# Patient Record
Sex: FEMALE | Race: WHITE | Employment: UNEMPLOYED | URBAN - METROPOLITAN AREA
[De-identification: names, ages, dates, MRNs, and addresses within clinical notes are randomized per-mention and may not be internally consistent; named-entity substitution may affect disease eponyms.]

---

## 2024-08-09 RX ORDER — METFORMIN HCL 500 MG
500 TABLET, EXTENDED RELEASE 24 HR ORAL
COMMUNITY

## 2024-08-09 RX ORDER — LEVOTHYROXINE SODIUM 75 UG/1
75 TABLET ORAL
COMMUNITY

## 2024-08-09 RX ORDER — BUDESONIDE AND FORMOTEROL FUMARATE DIHYDRATE 80; 4.5 UG/1; UG/1
2 AEROSOL RESPIRATORY (INHALATION) 2 TIMES DAILY
COMMUNITY

## 2024-08-09 RX ORDER — LEVOTHYROXINE SODIUM 50 UG/1
50 TABLET ORAL DAILY
COMMUNITY

## 2024-08-09 RX ORDER — MONTELUKAST SODIUM 10 MG/1
10 TABLET ORAL DAILY
COMMUNITY

## 2024-08-09 RX ORDER — GABAPENTIN 300 MG/1
300 CAPSULE ORAL DAILY
COMMUNITY

## 2024-08-09 RX ORDER — CITALOPRAM HYDROBROMIDE 10 MG/1
10 TABLET ORAL DAILY
COMMUNITY

## 2024-08-09 RX ORDER — AMITRIPTYLINE HYDROCHLORIDE 10 MG/1
10 TABLET ORAL NIGHTLY
COMMUNITY

## 2024-08-09 RX ORDER — MULTIVIT-MIN/IRON/FOLIC ACID/K 18-600-40
1 CAPSULE ORAL DAILY
COMMUNITY

## 2024-08-09 RX ORDER — BISOPROLOL FUMARATE 5 MG/1
5 TABLET, FILM COATED ORAL DAILY
COMMUNITY

## 2024-08-09 RX ORDER — ALBUTEROL SULFATE 0.83 MG/ML
2.5 SOLUTION RESPIRATORY (INHALATION) EVERY 6 HOURS PRN
COMMUNITY

## 2024-10-08 NOTE — PROGRESS NOTES
Patient reached ____ yes  __X___ no   VM instructions left __X__ yes   phone number _[phone]_______                                ____ no-office notified          Date __10/15/24_______  Time _1000______  Arrival ___0830  [facility]    Nothing to eat or drink after midnight-follow your doctors prep instructions-this may include taking a second dose of your prep after midnight  Responsible adult 18 or older to stay on site while you are here-drive you home-stay with you after  Follow any instructions your doctors office has given you  Bring a complete list of all your medications and supplements including name,dose,how often taken the day of your procedure  If you normally take the following medications in the morning please do so the AM of your procedure with a small sip of water       Heart,blood pressure,seizure,thyroid or breathing medications-use your inhalers-bring any rescue inhalers with you DOS       DO NOT take blood pressure medications ending in \"edelmira\" or \"pril\" the AM of procedure or evening prior  Dr Piedra patients are not to take any medications the AM of surgery  Take half or your normal dose of any long acting insulins the night before your procedure-do not take any diabetic medications the AM of procedure. If you take a weekly injection for diabetes or weight loss-do not take one week prior to surgery/procedure.If you have already taken your injection this week,contact your surgeon  Follow your doctors instructions regarding stopping or taking  any blood thinners-if you do not have instructions-call them  Any questions call your doctor  Other ______________________________________________________________      VISITOR POLICY(subject to change)             The current policy is 2 visitors per patient.There are no children allowed.Mask at discretion of facility. Visiting hours are 8a-8p.Overnight visitors will be at the discretion of the nurse. All policies are subject to change.

## 2024-10-15 ENCOUNTER — APPOINTMENT (OUTPATIENT)
Dept: ENDOSCOPY | Age: 49
End: 2024-10-15
Attending: INTERNAL MEDICINE
Payer: COMMERCIAL

## 2024-10-15 ENCOUNTER — ANESTHESIA (OUTPATIENT)
Dept: ENDOSCOPY | Age: 49
End: 2024-10-15
Payer: COMMERCIAL

## 2024-10-15 ENCOUNTER — HOSPITAL ENCOUNTER (OUTPATIENT)
Age: 49
Setting detail: OUTPATIENT SURGERY
Discharge: HOME | End: 2024-10-15
Attending: INTERNAL MEDICINE | Admitting: INTERNAL MEDICINE
Payer: COMMERCIAL

## 2024-10-15 ENCOUNTER — ANESTHESIA EVENT (OUTPATIENT)
Dept: ENDOSCOPY | Age: 49
End: 2024-10-15
Payer: COMMERCIAL

## 2024-10-15 VITALS
OXYGEN SATURATION: 100 % | HEART RATE: 71 BPM | BODY MASS INDEX: 28.35 KG/M2 | WEIGHT: 160 LBS | HEIGHT: 63 IN | SYSTOLIC BLOOD PRESSURE: 144 MMHG | DIASTOLIC BLOOD PRESSURE: 79 MMHG | RESPIRATION RATE: 15 BRPM | TEMPERATURE: 98.2 F

## 2024-10-15 DIAGNOSIS — K90.0 CELIAC DISEASE: ICD-10-CM

## 2024-10-15 DIAGNOSIS — K57.32 SIGMOID DIVERTICULITIS: ICD-10-CM

## 2024-10-15 LAB
GLUCOSE BLD-MCNC: 107 MG/DL (ref 70–99)
HCG SERPL QL: NEGATIVE
PERFORMED ON: ABNORMAL

## 2024-10-15 PROCEDURE — 88305 TISSUE EXAM BY PATHOLOGIST: CPT

## 2024-10-15 PROCEDURE — 7100000011 HC PHASE II RECOVERY - ADDTL 15 MIN: Performed by: INTERNAL MEDICINE

## 2024-10-15 PROCEDURE — 3700000001 HC ADD 15 MINUTES (ANESTHESIA): Performed by: INTERNAL MEDICINE

## 2024-10-15 PROCEDURE — 2720000010 HC SURG SUPPLY STERILE: Performed by: INTERNAL MEDICINE

## 2024-10-15 PROCEDURE — 84703 CHORIONIC GONADOTROPIN ASSAY: CPT

## 2024-10-15 PROCEDURE — 36415 COLL VENOUS BLD VENIPUNCTURE: CPT

## 2024-10-15 PROCEDURE — 6360000002 HC RX W HCPCS: Performed by: NURSE ANESTHETIST, CERTIFIED REGISTERED

## 2024-10-15 PROCEDURE — 3609012400 HC EGD TRANSORAL BIOPSY SINGLE/MULTIPLE: Performed by: INTERNAL MEDICINE

## 2024-10-15 PROCEDURE — 3700000000 HC ANESTHESIA ATTENDED CARE: Performed by: INTERNAL MEDICINE

## 2024-10-15 PROCEDURE — 7100000001 HC PACU RECOVERY - ADDTL 15 MIN: Performed by: INTERNAL MEDICINE

## 2024-10-15 PROCEDURE — 7100000010 HC PHASE II RECOVERY - FIRST 15 MIN: Performed by: INTERNAL MEDICINE

## 2024-10-15 PROCEDURE — 3609010600 HC COLONOSCOPY POLYPECTOMY SNARE/COLD BIOPSY: Performed by: INTERNAL MEDICINE

## 2024-10-15 PROCEDURE — 2709999900 HC NON-CHARGEABLE SUPPLY: Performed by: INTERNAL MEDICINE

## 2024-10-15 PROCEDURE — 7100000000 HC PACU RECOVERY - FIRST 15 MIN: Performed by: INTERNAL MEDICINE

## 2024-10-15 RX ORDER — PROPOFOL 10 MG/ML
INJECTION, EMULSION INTRAVENOUS
Status: DISCONTINUED | OUTPATIENT
Start: 2024-10-15 | End: 2024-10-15 | Stop reason: SDUPTHER

## 2024-10-15 RX ORDER — SODIUM CHLORIDE 0.9 % (FLUSH) 0.9 %
5-40 SYRINGE (ML) INJECTION PRN
Status: DISCONTINUED | OUTPATIENT
Start: 2024-10-15 | End: 2024-10-15 | Stop reason: HOSPADM

## 2024-10-15 RX ORDER — ONDANSETRON 2 MG/ML
4 INJECTION INTRAMUSCULAR; INTRAVENOUS
Status: DISCONTINUED | OUTPATIENT
Start: 2024-10-15 | End: 2024-10-15 | Stop reason: HOSPADM

## 2024-10-15 RX ORDER — IPRATROPIUM BROMIDE AND ALBUTEROL SULFATE 2.5; .5 MG/3ML; MG/3ML
1 SOLUTION RESPIRATORY (INHALATION)
Status: DISCONTINUED | OUTPATIENT
Start: 2024-10-15 | End: 2024-10-15 | Stop reason: HOSPADM

## 2024-10-15 RX ORDER — NALOXONE HYDROCHLORIDE 0.4 MG/ML
INJECTION, SOLUTION INTRAMUSCULAR; INTRAVENOUS; SUBCUTANEOUS PRN
Status: DISCONTINUED | OUTPATIENT
Start: 2024-10-15 | End: 2024-10-15 | Stop reason: HOSPADM

## 2024-10-15 RX ORDER — SODIUM CHLORIDE 9 MG/ML
INJECTION, SOLUTION INTRAVENOUS PRN
Status: DISCONTINUED | OUTPATIENT
Start: 2024-10-15 | End: 2024-10-15 | Stop reason: HOSPADM

## 2024-10-15 RX ORDER — SODIUM CHLORIDE 0.9 % (FLUSH) 0.9 %
5-40 SYRINGE (ML) INJECTION EVERY 12 HOURS SCHEDULED
Status: DISCONTINUED | OUTPATIENT
Start: 2024-10-15 | End: 2024-10-15 | Stop reason: HOSPADM

## 2024-10-15 RX ADMIN — PROPOFOL 30 MG: 10 INJECTION, EMULSION INTRAVENOUS at 10:41

## 2024-10-15 RX ADMIN — PROPOFOL 50 MG: 10 INJECTION, EMULSION INTRAVENOUS at 10:25

## 2024-10-15 RX ADMIN — PROPOFOL 20 MG: 10 INJECTION, EMULSION INTRAVENOUS at 10:39

## 2024-10-15 RX ADMIN — PROPOFOL 180 MCG/KG/MIN: 10 INJECTION, EMULSION INTRAVENOUS at 10:25

## 2024-10-15 ASSESSMENT — PAIN - FUNCTIONAL ASSESSMENT
PAIN_FUNCTIONAL_ASSESSMENT: NONE - DENIES PAIN
PAIN_FUNCTIONAL_ASSESSMENT: FACE, LEGS, ACTIVITY, CRY, AND CONSOLABILITY (FLACC)
PAIN_FUNCTIONAL_ASSESSMENT: NONE - DENIES PAIN

## 2024-10-15 NOTE — PROGRESS NOTES
Awake alert & oriented. Respirations easy & symmetrical. Abdomen soft. Denies pain. Discharge instructions reviewed with pt &  Jacob at bedside. Understanding verbalized. Written copy to pt. Patient discharged home per  to the care of the responsible party. No additional questions voiced related to discharge information. Patient discharged with all personal items.

## 2024-10-15 NOTE — PROGRESS NOTES
Adm to pacu bay 10 from endo per cart unresponsive. Respirations easy & symmetrical. Abdomen soft. VSS. Report received from endo staff.

## 2024-10-15 NOTE — ANESTHESIA POSTPROCEDURE EVALUATION
Department of Anesthesiology  Postprocedure Note    Patient: Jocelyn Willson  MRN: [id number]  YOB: 1975  Date of evaluation: 10/15/2024    Procedure Summary       Date: 10/15/24 Room / Location: [place] [address] / [facility]    Anesthesia Start: 1022 Anesthesia Stop: 1059    Procedures:       COLONOSCOPY POLYPECTOMY SNARE/BIOPSY      ESOPHAGOGASTRODUODENOSCOPY BIOPSY (Abdomen) Diagnosis:       Sigmoid diverticulitis      Celiac disease      (Sigmoid diverticulitis [K57.32])      (Celiac disease [K90.0])    Surgeons: Noel Hodgson MD Responsible Provider: José Miguel Fraire MD    Anesthesia Type: MAC ASA Status: 3            Anesthesia Type: No value filed.    Carina Phase I: Carina Score: 10    Carina Phase II: Carina Score: 10    Anesthesia Post Evaluation    Patient location during evaluation: PACU  Patient participation: complete - patient participated  Level of consciousness: awake and alert  Airway patency: patent  Nausea & Vomiting: no nausea and no vomiting  Cardiovascular status: hemodynamically stable  Respiratory status: acceptable  Hydration status: euvolemic  Pain management: adequate    No notable events documented.

## 2024-10-15 NOTE — PROCEDURES
EGD and Colonoscopy Procedure  Note            Patient: Jocelyn Willson  : 1975  CSN:     Procedure: Esophagogastroduodenoscopy with biopsy. Colonoscopy with snare cautery and Hemoclip placement    Date:  10/15/2024     Surgeon:  DENISE MCLEOD MD     Referring Provider:     Preoperative Diagnosis: Remote history for celiac disease, recent diverticulitis    Postoperative Diagnosis:    -Antral gastritis  -Gastric polyps  -Pandiverticulosis with predominantly left-sided diverticulosis  -Large polyp (2 -2.5 cm) in the cecum close to appendix on a stalk    Anesthesia: Propofol sedation    EBL: <50 mL    Indications: This is a 49 y.o. year old female who presents today for EGD and colonoscopy.  She has remote history for celiac disease on gluten-free diet, recent diverticulitis.        Procedure Details:    With the patient in left lateral position the endoscope was passed through the hypopharynx into the esophagus. The scope was advanced all the way to the duodenum.  The mucosa in the duodenal bulb, post bulbar region in the descending duodenum appeared normal.  Biopsies from duodenum were obtained given her history for celiac disease.  The mucosa in the antrum revealed antral gastritis, biopsies obtained to rule out Helicobacter pylori infection.  The mucosa in the remaining part of the stomach and retroflexion appeared normal, except for small tiny gastric polyps, biopsies obtained to confirm pathology.  Most likely fundic gland polyps.  The GE junction was at around 37 cms. the mucosa in the remainder of the esophagus appeared normal.  The scope was withdrawn and the procedure was terminated.      Procedure Details:    With the patient in left lateral decubitus position the endoscope was inserted through the anorectal area into the rectum. The scope was then advanced through the length of the colon to the cecum. The ileocecal valve was visualized and cannulated. The quality of preparation was adequate.

## 2024-10-15 NOTE — DISCHARGE INSTRUCTIONS
Impression:    -Antral gastritis  -Gastric polyps  -Pandiverticulosis with predominantly left-sided diverticulosis  -Large polyp (2 -2.5 cm) in the cecum close to appendix on a stalk     Recommendations:    -Await pathology result  -Follow-up with referring MD  -Avoid aspirin and NSAIDs for 7 to 10 days  -Plan for repeat colonoscopy in 4 to 6 months, to evaluate the polyp site       ENDOSCOPY DISCHARGE INSTRUCTIONS    You may experience some lightheadedness for the next several hours.  Plan on quiet relaxation for the rest of today.  A responsible adult needs to stay with you today.  Because of the medications you received today-do not drive,operate machinery,or sign any contractual agreement for the next 24 hours.  Do not drink any alcoholic beverages or take any unprescribed medications tonight.  Eat bland food and avoid anything greasy or spicy initially-progress to your normal diet gradually.  Diet restrictions as instructed.  You may resume home medications as instructed.  It is not unusual to experience some mild cramping or gas pains, and you may not have a bowel movement for several days.  If you have any of the following problems, notify your physician or return to the hospital emergency room : fever, chills, excessive bleeding, excessive vomiting, difficulty swallowing, uncontrolled pain, increased abdominal distention, shortness of breath or any other problems.  If you had a polyp removed, avoid strenuous activity for 48 hours.Avoid the use of aspirin or related compounds for one week, unless otherwise instructed by your physician.  You may notice a small amount of blood in your next few bowel movements, but if a large amount passes, call your physician.  If you have a sore throat, you may use lozenges or salt water gargles.

## 2024-10-15 NOTE — H&P
Gastroenterology Note             Pre-operative History and Physical    Patient: Jocelyn Willson  : 1975  CSN:     History Obtained From:  patient and/or guardian.     HISTORY OF PRESENT ILLNESS:    The patient is a 49 y.o. female  here for EGD/colonoscopy.  Remote history for celiac disease here for eval. history for diverticulitis.  Recent history for breast cancer    Past Medical History:    Past Medical History:   Diagnosis Date    Asthma     Breast cancer (HCC)     right    Celiac disease     Diverticulitis     Fatty liver     Hashimoto's disease     Hypertension     Migraine     Pre-diabetes      Past Surgical History:    Past Surgical History:   Procedure Laterality Date    MASTECTOMY, BILATERAL      lymph node removal on right     Medications Prior to Admission:   No current facility-administered medications on file prior to encounter.     Current Outpatient Medications on File Prior to Encounter   Medication Sig Dispense Refill    NONFORMULARY Iron infusions weekly      montelukast (SINGULAIR) 10 MG tablet Take 1 tablet by mouth daily      Multiple Vitamin (MULTIVITAMIN ADULT PO) Take 1 tablet by mouth daily      Cholecalciferol (VITAMIN D) 50 MCG (2000 UT) CAPS capsule Take 1 capsule by mouth daily      levothyroxine (SYNTHROID) 75 MCG tablet Take 1 tablet by mouth every morning (before breakfast)      bisoprolol (ZEBETA) 5 MG tablet Take 1 tablet by mouth daily      levothyroxine (SYNTHROID) 50 MCG tablet Take 1 tablet by mouth Daily      amitriptyline (ELAVIL) 10 MG tablet Take 1 tablet by mouth nightly (Patient not taking: Reported on 10/15/2024)      vitamin D (CHOLECALCIFEROL) 25 MCG (1000 UT) TABS tablet Take 1 tablet by mouth daily (Patient not taking: Reported on 10/15/2024)      citalopram (CELEXA) 10 MG tablet Take 1 tablet by mouth daily (Patient not taking: Reported on 10/15/2024)      metFORMIN (GLUCOPHAGE-XR) 500 MG extended release tablet Take 1 tablet by mouth Daily with

## 2024-10-15 NOTE — ANESTHESIA PRE PROCEDURE
hours.    Coags: No results found for: \"PROTIME\", \"INR\", \"APTT\"    HCG (If Applicable): No results found for: \"PREGTESTUR\", \"PREGSERUM\", \"HCG\", \"HCGQUANT\"     ABGs: No results found for: \"PHART\", \"PO2ART\", \"ALH9EQE\", \"KHS8OHG\", \"BEART\", \"P9WBIMBM\"     Type & Screen (If Applicable):  No results found for: \"ABORH\", \"LABANTI\"    Drug/Infectious Status (If Applicable):  No results found for: \"HIV\", \"HEPCAB\"    COVID-19 Screening (If Applicable): No results found for: \"COVID19\"        Anesthesia Evaluation  Patient summary reviewed   no history of anesthetic complications:   Airway: Mallampati: I  TM distance: >3 FB     Mouth opening: > = 3 FB   Dental: normal exam         Pulmonary:normal exam    (+)           asthma:                            Cardiovascular:    (+) hypertension:, hyperlipidemia               ROS comment:     03/2021 TTE:  The ejection fraction, measured in 2D mode, is 65 %. Mild mitral valve regurgitation. Trivial tricuspid valve regurgitation.      Neuro/Psych:   (+) headaches: migraine headaches            GI/Hepatic/Renal:             Endo/Other:    (+) Diabetes (Pre diabetes), hypothyroidism::., malignancy/cancer (CA breast , post bilateral mastectomies 02/2024).                 Abdominal:             Vascular:          Other Findings:       Anesthesia Plan      MAC     ASA 3       Induction: intravenous.      Anesthetic plan and risks discussed with patient.      Plan discussed with CRNA.                José Miguel Fraire MD   10/15/2024

## (undated) DEVICE — Device: Brand: DISPOSABLE ELECTROSURGICAL SNARE

## (undated) DEVICE — BW-412T DISP COMBO CLEANING BRUSH: Brand: SINGLE USE COMBINATION CLEANING BRUSH

## (undated) DEVICE — FORCEPS BX L240CM WRK CHN 2.8MM STD CAP W/ NDL MIC MESH

## (undated) DEVICE — REPLAY HEMOSTASIS CLIP, 16MM SPAN: Brand: REPLAY

## (undated) DEVICE — SINGLE USE AIR/WATER, SUCTION AND BIOPSY VALVES SET: Brand: ORCAPOD™

## (undated) DEVICE — ELECTRODE PT RET AD L9FT HI MOIST COND ADH HYDRGEL CORDED

## (undated) DEVICE — ENDOSCOPIC KIT 6X3/16 FT COLON W/ 1.1 OZ 2 GWN W/O BRSH

## (undated) DEVICE — MOUTHPIECE ENDOSCP L CTRL OPN AND SIDE PORTS DISP

## (undated) DEVICE — AIR/WATER CLEANING ADAPTER FOR OLYMPUS® GI ENDOSCOPE: Brand: BULLDOG®

## (undated) DEVICE — SOLUTION IRRIG 500ML STRL H2O NONPYROGENIC

## (undated) DEVICE — TRAP SPEC RETRV CLR PLAS POLYP IN LN SUCT QUIK CTCH